# Patient Record
Sex: MALE | Race: WHITE | NOT HISPANIC OR LATINO | URBAN - NONMETROPOLITAN AREA
[De-identification: names, ages, dates, MRNs, and addresses within clinical notes are randomized per-mention and may not be internally consistent; named-entity substitution may affect disease eponyms.]

---

## 2021-07-07 ENCOUNTER — WEB ENCOUNTER (OUTPATIENT)
Dept: URBAN - NONMETROPOLITAN AREA CLINIC 2 | Facility: CLINIC | Age: 41
End: 2021-07-07

## 2021-07-08 ENCOUNTER — OFFICE VISIT (OUTPATIENT)
Dept: URBAN - NONMETROPOLITAN AREA CLINIC 2 | Facility: CLINIC | Age: 41
End: 2021-07-08
Payer: COMMERCIAL

## 2021-07-08 ENCOUNTER — WEB ENCOUNTER (OUTPATIENT)
Dept: URBAN - NONMETROPOLITAN AREA CLINIC 2 | Facility: CLINIC | Age: 41
End: 2021-07-08

## 2021-07-08 DIAGNOSIS — R15.2 FECAL URGENCY: ICD-10-CM

## 2021-07-08 DIAGNOSIS — R14.0 BLOATING: ICD-10-CM

## 2021-07-08 DIAGNOSIS — R93.5 ABNORMAL US (ULTRASOUND) OF ABDOMEN: ICD-10-CM

## 2021-07-08 DIAGNOSIS — K76.0 FATTY LIVER: ICD-10-CM

## 2021-07-08 DIAGNOSIS — R10.12 LUQ ABDOMINAL PAIN: ICD-10-CM

## 2021-07-08 PROCEDURE — 99244 OFF/OP CNSLTJ NEW/EST MOD 40: CPT | Performed by: INTERNAL MEDICINE

## 2021-07-08 PROCEDURE — 99204 OFFICE O/P NEW MOD 45 MIN: CPT | Performed by: INTERNAL MEDICINE

## 2021-07-08 RX ORDER — FAMOTIDINE 20 MG/1
1 TABLET AT BEDTIME AS NEEDED TABLET, FILM COATED ORAL ONCE A DAY
Status: ACTIVE | COMMUNITY

## 2021-07-08 RX ORDER — VORTIOXETINE 10 MG/1
1 TABLET TABLET, FILM COATED ORAL ONCE A DAY
Status: ACTIVE | COMMUNITY

## 2021-07-08 RX ORDER — LORATADINE 10 MG/1
1 TABLET TABLET ORAL ONCE A DAY
Status: ACTIVE | COMMUNITY

## 2021-07-10 LAB
ALPHA 2-MACROGLOBULINS, QN: 108
ALT (SGPT) P5P: 31
APOLIPOPROTEIN A-1: 110
AST (SGOT) P5P: 22
BILIRUBIN, TOTAL: 0.5
C-REACTIVE PROTEIN, QUANT: <1
CHOLESTEROL, TOTAL: 181
COMMENT:: (no result)
DEAMIDATED GLIADIN ABS, IGA: 5
DEAMIDATED GLIADIN ABS, IGG: 4
ENDOMYSIAL ANTIBODY IGA: NEGATIVE
FIBROSIS SCORE: 0.07
FIBROSIS SCORING:: (no result)
FIBROSIS STAGE: (no result)
GGT: 15
GLUCOSE, SERUM: 92
HAPTOGLOBIN: 128
HEIGHT:: 80
IMMUNOGLOBULIN A, QN, SERUM: 126
INTERPRETATIONS:: (no result)
LIMITATIONS:: (no result)
NASH GRADE: (no result)
NASH SCORE: 0.5
NASH SCORING: (no result)
SEDIMENTATION RATE-WESTERGREN: 2
STEATOSIS GRADE: (no result)
STEATOSIS GRADING: (no result)
STEATOSIS SCORE: 0.62
T-TRANSGLUTAMINASE (TTG) IGA: 2
T-TRANSGLUTAMINASE (TTG) IGG: 5
TRIGLYCERIDES: 209
WEIGHT:: 275

## 2021-07-12 ENCOUNTER — WEB ENCOUNTER (OUTPATIENT)
Dept: URBAN - METROPOLITAN AREA CLINIC 92 | Facility: CLINIC | Age: 41
End: 2021-07-12

## 2021-07-12 ENCOUNTER — WEB ENCOUNTER (OUTPATIENT)
Dept: URBAN - NONMETROPOLITAN AREA CLINIC 2 | Facility: CLINIC | Age: 41
End: 2021-07-12

## 2021-07-13 ENCOUNTER — WEB ENCOUNTER (OUTPATIENT)
Dept: URBAN - NONMETROPOLITAN AREA CLINIC 2 | Facility: CLINIC | Age: 41
End: 2021-07-13

## 2021-07-14 ENCOUNTER — WEB ENCOUNTER (OUTPATIENT)
Dept: URBAN - NONMETROPOLITAN AREA CLINIC 2 | Facility: CLINIC | Age: 41
End: 2021-07-14

## 2021-08-12 ENCOUNTER — OFFICE VISIT (OUTPATIENT)
Dept: URBAN - NONMETROPOLITAN AREA CLINIC 2 | Facility: CLINIC | Age: 41
End: 2021-08-12
Payer: COMMERCIAL

## 2021-08-12 DIAGNOSIS — R15.2 FECAL URGENCY: ICD-10-CM

## 2021-08-12 DIAGNOSIS — K58.0 IRRITABLE BOWEL SYNDROME WITH DIARRHEA: ICD-10-CM

## 2021-08-12 DIAGNOSIS — R93.5 ABNORMAL US (ULTRASOUND) OF ABDOMEN: ICD-10-CM

## 2021-08-12 DIAGNOSIS — K76.0 FATTY LIVER: ICD-10-CM

## 2021-08-12 DIAGNOSIS — R14.0 BLOATING: ICD-10-CM

## 2021-08-12 DIAGNOSIS — R10.12 LUQ ABDOMINAL PAIN: ICD-10-CM

## 2021-08-12 PROCEDURE — 99213 OFFICE O/P EST LOW 20 MIN: CPT | Performed by: INTERNAL MEDICINE

## 2021-08-12 RX ORDER — RIFAXIMIN 550 MG/1
1 TABLET TABLET ORAL THREE TIMES A DAY
Qty: 42 TABLET | Refills: 0 | OUTPATIENT
Start: 2021-08-12 | End: 2021-08-26

## 2021-08-12 RX ORDER — VORTIOXETINE 10 MG/1
1 TABLET TABLET, FILM COATED ORAL ONCE A DAY
Status: ACTIVE | COMMUNITY

## 2021-08-12 RX ORDER — FAMOTIDINE 20 MG/1
1 TABLET AT BEDTIME AS NEEDED TABLET, FILM COATED ORAL ONCE A DAY
Status: ACTIVE | COMMUNITY

## 2021-08-12 RX ORDER — LORATADINE 10 MG/1
1 TABLET TABLET ORAL ONCE A DAY
Status: ACTIVE | COMMUNITY

## 2021-08-12 NOTE — HPI-TODAY'S VISIT:
Samuel is a 40-year-old male who we have been asked to consult on by Yonas Fletcher for fatty liver and bowel issues.  A copy of this note along with the recommendations will be sent to referring provider.  He was having some intermittent left upper quad pain that has since subsided.  At that time, he had an abdominal ultrasound at [facility], [place] showing an enlarged spleen as well as fatty liver.  He brings with him labs showing a normal CBC, CMP, and thyroid with PCP.  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  However, over the last few years he has not had any alcohol and tries to eat a fairly healthy diet.  He does reports that a lot of his GI symptoms he had while he was a heavy drinker in his 20s and he just thought it was related to that.  However, since getting healthy over the last few years, his complaints are about the same.  He typically has about 6 formed bowel movements with some urgency daily.  They are pretty regular and most occurring postprandial in the morning.  No nocturnal complaints or blood in the stool.  He often feels like he has some epigastric bloating or slow digestion.  His main complaint is some fatigue after eating.  His fatigue seems to be more related to a paleo diet.  However, it does occur outside of that.  Some days it is quite severe.  No additional complaints.  Otherwise, he is healthy. Sb   8/12/21  Samuel returns for f/u of loose stool. He did the everlywell test and was noted to have egg intolerance. He is currently avoiding dairy, gluten, and egg as well as doing intermittent fasting and his symptoms have improved 50-75%. still has 3 stools daily but this is decreased from 6. no further fatigue after eating either. SB

## 2022-01-19 ENCOUNTER — WEB ENCOUNTER (OUTPATIENT)
Dept: URBAN - NONMETROPOLITAN AREA CLINIC 2 | Facility: CLINIC | Age: 42
End: 2022-01-19

## 2022-02-10 ENCOUNTER — OFFICE VISIT (OUTPATIENT)
Dept: URBAN - NONMETROPOLITAN AREA CLINIC 2 | Facility: CLINIC | Age: 42
End: 2022-02-10
Payer: COMMERCIAL

## 2022-02-10 VITALS
BODY MASS INDEX: 32.28 KG/M2 | DIASTOLIC BLOOD PRESSURE: 91 MMHG | SYSTOLIC BLOOD PRESSURE: 151 MMHG | WEIGHT: 279 LBS | TEMPERATURE: 97 F | HEIGHT: 78 IN | HEART RATE: 83 BPM

## 2022-02-10 DIAGNOSIS — R93.5 ABNORMAL US (ULTRASOUND) OF ABDOMEN: ICD-10-CM

## 2022-02-10 DIAGNOSIS — K58.0 IRRITABLE BOWEL SYNDROME WITH DIARRHEA: ICD-10-CM

## 2022-02-10 DIAGNOSIS — R10.12 LUQ ABDOMINAL PAIN: ICD-10-CM

## 2022-02-10 DIAGNOSIS — K76.0 FATTY LIVER: ICD-10-CM

## 2022-02-10 DIAGNOSIS — R15.2 FECAL URGENCY: ICD-10-CM

## 2022-02-10 DIAGNOSIS — R14.0 BLOATING: ICD-10-CM

## 2022-02-10 PROCEDURE — 99214 OFFICE O/P EST MOD 30 MIN: CPT | Performed by: NURSE PRACTITIONER

## 2022-02-10 RX ORDER — LORATADINE 10 MG/1
1 TABLET TABLET ORAL ONCE A DAY
Status: ACTIVE | COMMUNITY

## 2022-02-10 RX ORDER — VORTIOXETINE 10 MG/1
1 TABLET TABLET, FILM COATED ORAL ONCE A DAY
Status: ACTIVE | COMMUNITY

## 2022-02-10 RX ORDER — FAMOTIDINE 20 MG/1
1 TABLET AT BEDTIME AS NEEDED TABLET, FILM COATED ORAL ONCE A DAY
Status: ACTIVE | COMMUNITY

## 2022-02-10 NOTE — HPI-TODAY'S VISIT:
consult: Samuel is a 40-year-old male who we have been asked to consult on by Yonas Fletcher for fatty liver and bowel issues.  A copy of this note along with the recommendations will be sent to referring provider.  He was having some intermittent left upper quad pain that has since subsided.  At that time, he had an abdominal ultrasound at [facility] showing an enlarged spleen as well as fatty liver.  He brings with him labs showing a normal CBC, CMP, and thyroid with PCP.  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  However, over the last few years he has not had any alcohol and tries to eat a fairly healthy diet.  He does reports that a lot of his GI symptoms he had while he was a heavy drinker in his 20s and he just thought it was related to that.  However, since getting healthy over the last few years, his complaints are about the same.  He typically has about 6 formed bowel movements with some urgency daily.  They are pretty regular and most occurring postprandial in the morning.  No nocturnal complaints or blood in the stool.  He often feels like he has some epigastric bloating or slow digestion.  His main complaint is some fatigue after eating.  His fatigue seems to be more related to a paleo diet.  However, it does occur outside of that.  Some days it is quite severe.  No additional complaints.  Otherwise, he is healthy. Gregorio    Samuel returns for f/u of loose stool. He did the everlywell test and was noted to have egg intolerance. He is currently avoiding dairy and egg  have improved 50-75%. still has 3 stools daily but this is decreased from 6. no further fatigue after eating either. overall, he is feeling well from gi standpoint.  GREGORIO

## 2022-02-11 ENCOUNTER — WEB ENCOUNTER (OUTPATIENT)
Dept: URBAN - METROPOLITAN AREA CLINIC 92 | Facility: CLINIC | Age: 42
End: 2022-02-11

## 2022-02-11 LAB
A/G RATIO: 2.2
ALBUMIN: 4.8
ALKALINE PHOSPHATASE: 77
ALT (SGPT): 32
AST (SGOT): 18
BASO (ABSOLUTE): 0
BASOS: 0
BILIRUBIN, TOTAL: 0.5
BUN/CREATININE RATIO: 16
BUN: 15
CALCIUM: 9.2
CARBON DIOXIDE, TOTAL: 22
CHLORIDE: 105
CREATININE: 0.91
EGFR IF AFRICN AM: 121
EGFR IF NONAFRICN AM: 105
EOS (ABSOLUTE): 0.1
EOS: 2
GLOBULIN, TOTAL: 2.2
GLUCOSE: 94
HEMATOCRIT: 42.5
HEMATOLOGY COMMENTS:: (no result)
HEMOGLOBIN: 14.7
IMMATURE CELLS: (no result)
IMMATURE GRANS (ABS): 0
IMMATURE GRANULOCYTES: 0
LYMPHS (ABSOLUTE): 1.6
LYMPHS: 31
MCH: 30.6
MCHC: 34.6
MCV: 89
MONOCYTES(ABSOLUTE): 0.3
MONOCYTES: 7
NEUTROPHILS (ABSOLUTE): 3
NEUTROPHILS: 60
NRBC: (no result)
PLATELETS: 236
POTASSIUM: 4.4
PROTEIN, TOTAL: 7
RBC: 4.8
RDW: 12.5
SODIUM: 142
WBC: 5.1

## 2022-04-13 ENCOUNTER — WEB ENCOUNTER (OUTPATIENT)
Dept: URBAN - NONMETROPOLITAN AREA CLINIC 2 | Facility: CLINIC | Age: 42
End: 2022-04-13

## 2022-06-23 ENCOUNTER — WEB ENCOUNTER (OUTPATIENT)
Dept: URBAN - NONMETROPOLITAN AREA CLINIC 2 | Facility: CLINIC | Age: 42
End: 2022-06-23

## 2022-07-05 ENCOUNTER — OFFICE VISIT (OUTPATIENT)
Dept: URBAN - NONMETROPOLITAN AREA CLINIC 2 | Facility: CLINIC | Age: 42
End: 2022-07-05
Payer: COMMERCIAL

## 2022-07-05 ENCOUNTER — DASHBOARD ENCOUNTERS (OUTPATIENT)
Age: 42
End: 2022-07-05

## 2022-07-05 VITALS
DIASTOLIC BLOOD PRESSURE: 84 MMHG | SYSTOLIC BLOOD PRESSURE: 121 MMHG | HEART RATE: 82 BPM | HEIGHT: 78 IN | WEIGHT: 270 LBS | BODY MASS INDEX: 31.24 KG/M2

## 2022-07-05 DIAGNOSIS — K76.0 FATTY LIVER: ICD-10-CM

## 2022-07-05 DIAGNOSIS — R15.2 FECAL URGENCY: ICD-10-CM

## 2022-07-05 DIAGNOSIS — K58.0 IRRITABLE BOWEL SYNDROME WITH DIARRHEA: ICD-10-CM

## 2022-07-05 DIAGNOSIS — R10.12 LUQ ABDOMINAL PAIN: ICD-10-CM

## 2022-07-05 DIAGNOSIS — R93.5 ABNORMAL US (ULTRASOUND) OF ABDOMEN: ICD-10-CM

## 2022-07-05 DIAGNOSIS — R14.0 BLOATING: ICD-10-CM

## 2022-07-05 PROBLEM — 197321007: Status: ACTIVE | Noted: 2021-07-08

## 2022-07-05 PROCEDURE — 99214 OFFICE O/P EST MOD 30 MIN: CPT | Performed by: NURSE PRACTITIONER

## 2022-07-05 RX ORDER — SODIUM PICOSULFATE, MAGNESIUM OXIDE, AND ANHYDROUS CITRIC ACID 10; 3.5; 12 MG/160ML; G/160ML; G/160ML
160 ML LIQUID ORAL AS DIRECTED
Qty: 1 KIT | Refills: 0 | OUTPATIENT
Start: 2022-07-05 | End: 2022-07-07

## 2022-07-05 RX ORDER — LORATADINE 10 MG/1
1 TABLET TABLET ORAL ONCE A DAY
Status: ACTIVE | COMMUNITY

## 2022-07-05 RX ORDER — VORTIOXETINE 10 MG/1
1 TABLET TABLET, FILM COATED ORAL ONCE A DAY
Status: ACTIVE | COMMUNITY

## 2022-07-05 RX ORDER — FAMOTIDINE 20 MG/1
1 TABLET AT BEDTIME AS NEEDED TABLET, FILM COATED ORAL ONCE A DAY
Status: ACTIVE | COMMUNITY

## 2022-07-05 NOTE — HPI-TODAY'S VISIT:
consult: Samuel is a 40-year-old male who we have been asked to consult on by Yonas Fletcher for fatty liver and bowel issues.  A copy of this note along with the recommendations will be sent to referring provider.  He was having some intermittent left upper quad pain that has since subsided.  At that time, he had an abdominal ultrasound at [facility] showing an enlarged spleen as well as fatty liver.  He brings with him labs showing a normal CBC, CMP, and thyroid with PCP.  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  However, over the last few years he has not had any alcohol and tries to eat a fairly healthy diet.  He does reports that a lot of his GI symptoms he had while he was a heavy drinker in his 20s and he just thought it was related to that.  However, since getting healthy over the last few years, his complaints are about the same.  He typically has about 6 formed bowel movements with some urgency daily.  They are pretty regular and most occurring postprandial in the morning.  No nocturnal complaints or blood in the stool.  He often feels like he has some epigastric bloating or slow digestion.  His main complaint is some fatigue after eating.  His fatigue seems to be more related to a paleo diet.  However, it does occur outside of that.  Some days it is quite severe.  No additional complaints.  Otherwise, he is healthy. Sb    Samuel returns for f/u of loose stool. He did the everlywell test and was noted to have egg intolerance. He is currently avoiding dairy, dairy, and gluten. initially, this improved his complaints, but symptoms have returned. he has realized he has been eating lots of high fodmap foods. also reports stress tends to increase complaints.  GREGORIO

## 2022-07-06 PROBLEM — 197125005: Status: ACTIVE | Noted: 2021-08-12

## 2022-08-03 ENCOUNTER — CLAIMS CREATED FROM THE CLAIM WINDOW (OUTPATIENT)
Dept: URBAN - METROPOLITAN AREA CLINIC 4 | Facility: CLINIC | Age: 42
End: 2022-08-03
Payer: COMMERCIAL

## 2022-08-03 ENCOUNTER — OFFICE VISIT (OUTPATIENT)
Dept: URBAN - NONMETROPOLITAN AREA SURGERY CENTER 1 | Facility: SURGERY CENTER | Age: 42
End: 2022-08-03
Payer: COMMERCIAL

## 2022-08-03 DIAGNOSIS — K63.89 OTHER SPECIFIED DISEASES OF INTESTINE: ICD-10-CM

## 2022-08-03 DIAGNOSIS — K21.9 GASTRO-ESOPHAGEAL REFLUX DISEASE WITHOUT ESOPHAGITIS: ICD-10-CM

## 2022-08-03 DIAGNOSIS — K31.89 OTHER DISEASES OF STOMACH AND DUODENUM: ICD-10-CM

## 2022-08-03 DIAGNOSIS — K21.9 ACID REFLUX: ICD-10-CM

## 2022-08-03 DIAGNOSIS — R19.7 ACUTE DIARRHEA: ICD-10-CM

## 2022-08-03 DIAGNOSIS — K31.89 ACQUIRED DEFORMITY OF DUODENUM: ICD-10-CM

## 2022-08-03 PROCEDURE — G8907 PT DOC NO EVENTS ON DISCHARG: HCPCS | Performed by: INTERNAL MEDICINE

## 2022-08-03 PROCEDURE — 88313 SPECIAL STAINS GROUP 2: CPT | Performed by: PATHOLOGY

## 2022-08-03 PROCEDURE — 88312 SPECIAL STAINS GROUP 1: CPT | Performed by: PATHOLOGY

## 2022-08-03 PROCEDURE — 88305 TISSUE EXAM BY PATHOLOGIST: CPT | Performed by: PATHOLOGY

## 2022-08-03 PROCEDURE — 43239 EGD BIOPSY SINGLE/MULTIPLE: CPT | Performed by: INTERNAL MEDICINE

## 2022-08-03 PROCEDURE — 88342 IMHCHEM/IMCYTCHM 1ST ANTB: CPT | Performed by: PATHOLOGY

## 2022-08-03 PROCEDURE — 45380 COLONOSCOPY AND BIOPSY: CPT | Performed by: INTERNAL MEDICINE

## 2022-08-03 RX ORDER — VORTIOXETINE 10 MG/1
1 TABLET TABLET, FILM COATED ORAL ONCE A DAY
Status: ACTIVE | COMMUNITY

## 2022-08-03 RX ORDER — FAMOTIDINE 20 MG/1
1 TABLET AT BEDTIME AS NEEDED TABLET, FILM COATED ORAL ONCE A DAY
Status: ACTIVE | COMMUNITY

## 2022-08-03 RX ORDER — LORATADINE 10 MG/1
1 TABLET TABLET ORAL ONCE A DAY
Status: ACTIVE | COMMUNITY

## 2022-08-04 ENCOUNTER — TELEPHONE ENCOUNTER (OUTPATIENT)
Dept: URBAN - METROPOLITAN AREA CLINIC 92 | Facility: CLINIC | Age: 42
End: 2022-08-04

## 2022-08-04 PROBLEM — 266433003: Status: ACTIVE | Noted: 2022-08-04

## 2022-08-04 RX ORDER — LORATADINE 10 MG/1
1 TABLET TABLET ORAL ONCE A DAY
Status: ACTIVE | COMMUNITY

## 2022-08-04 RX ORDER — OMEPRAZOLE 40 MG/1
1 CAPSULE 30 MINUTES BEFORE MORNING MEAL CAPSULE, DELAYED RELEASE ORAL ONCE A DAY
Qty: 90 | Refills: 3 | OUTPATIENT
Start: 2022-08-04

## 2022-08-04 RX ORDER — FAMOTIDINE 20 MG/1
1 TABLET AT BEDTIME AS NEEDED TABLET, FILM COATED ORAL ONCE A DAY
Status: ACTIVE | COMMUNITY

## 2022-08-04 RX ORDER — VORTIOXETINE 10 MG/1
1 TABLET TABLET, FILM COATED ORAL ONCE A DAY
Status: ACTIVE | COMMUNITY

## 2022-09-16 ENCOUNTER — OFFICE VISIT (OUTPATIENT)
Dept: URBAN - NONMETROPOLITAN AREA CLINIC 2 | Facility: CLINIC | Age: 42
End: 2022-09-16

## 2022-11-10 ENCOUNTER — OFFICE VISIT (OUTPATIENT)
Dept: URBAN - NONMETROPOLITAN AREA CLINIC 2 | Facility: CLINIC | Age: 42
End: 2022-11-10

## 2024-05-17 ENCOUNTER — OFFICE VISIT (OUTPATIENT)
Dept: URBAN - NONMETROPOLITAN AREA CLINIC 2 | Facility: CLINIC | Age: 44
End: 2024-05-17

## 2024-05-17 RX ORDER — VORTIOXETINE 10 MG/1
1 TABLET TABLET, FILM COATED ORAL ONCE A DAY
Status: ACTIVE | COMMUNITY

## 2024-05-17 RX ORDER — FAMOTIDINE 20 MG/1
1 TABLET AT BEDTIME AS NEEDED TABLET, FILM COATED ORAL ONCE A DAY
Status: ACTIVE | COMMUNITY

## 2024-05-17 RX ORDER — LORATADINE 10 MG/1
1 TABLET TABLET ORAL ONCE A DAY
Status: ACTIVE | COMMUNITY

## 2024-05-17 RX ORDER — OMEPRAZOLE 40 MG/1
1 CAPSULE 30 MINUTES BEFORE MORNING MEAL CAPSULE, DELAYED RELEASE ORAL ONCE A DAY
Qty: 90 | Refills: 3 | Status: ACTIVE | COMMUNITY
Start: 2022-08-04